# Patient Record
Sex: MALE | URBAN - METROPOLITAN AREA
[De-identification: names, ages, dates, MRNs, and addresses within clinical notes are randomized per-mention and may not be internally consistent; named-entity substitution may affect disease eponyms.]

---

## 2018-03-30 ENCOUNTER — APPOINTMENT (OUTPATIENT)
Age: 33
Setting detail: DERMATOLOGY
End: 2018-03-30

## 2018-03-30 VITALS — WEIGHT: 190 LBS | HEIGHT: 72 IN

## 2018-03-30 DIAGNOSIS — A63.0 ANOGENITAL (VENEREAL) WARTS: ICD-10-CM

## 2018-03-30 DIAGNOSIS — D485 NEOPLASM OF UNCERTAIN BEHAVIOR OF SKIN: ICD-10-CM

## 2018-03-30 DIAGNOSIS — D22 MELANOCYTIC NEVI: ICD-10-CM

## 2018-03-30 PROBLEM — D22.5 MELANOCYTIC NEVI OF TRUNK: Status: ACTIVE | Noted: 2018-03-30

## 2018-03-30 PROBLEM — D48.5 NEOPLASM OF UNCERTAIN BEHAVIOR OF SKIN: Status: ACTIVE | Noted: 2018-03-30

## 2018-03-30 PROCEDURE — OTHER TREATMENT REGIMEN: OTHER

## 2018-03-30 PROCEDURE — 99203 OFFICE O/P NEW LOW 30 MIN: CPT | Mod: 25

## 2018-03-30 PROCEDURE — OTHER FOLLOW UP FOR NEXT VISIT: OTHER

## 2018-03-30 PROCEDURE — OTHER REASSURANCE: OTHER

## 2018-03-30 PROCEDURE — OTHER LIQUID NITROGEN GENITALS MULTI: OTHER

## 2018-03-30 PROCEDURE — 54056 CRYOSURGERY PENIS LESION(S): CPT

## 2018-03-30 PROCEDURE — OTHER COUNSELING: OTHER

## 2018-03-30 ASSESSMENT — LOCATION DETAILED DESCRIPTION DERM
LOCATION DETAILED: RIGHT SUPERIOR UPPER BACK
LOCATION DETAILED: RIGHT ANTERIOR PROXIMAL UPPER ARM
LOCATION DETAILED: RIGHT INFERIOR UPPER BACK
LOCATION DETAILED: SUPRAPUBIC SKIN
LOCATION DETAILED: LEFT DORSAL SHAFT OF PENIS

## 2018-03-30 ASSESSMENT — LOCATION ZONE DERM
LOCATION ZONE: PENIS
LOCATION ZONE: TRUNK
LOCATION ZONE: ARM

## 2018-03-30 ASSESSMENT — LOCATION SIMPLE DESCRIPTION DERM
LOCATION SIMPLE: GROIN
LOCATION SIMPLE: PENIS
LOCATION SIMPLE: RIGHT UPPER ARM
LOCATION SIMPLE: RIGHT UPPER BACK

## 2018-03-30 ASSESSMENT — PAIN INTENSITY VAS: HOW INTENSE IS YOUR PAIN 0 BEING NO PAIN, 10 BEING THE MOST SEVERE PAIN POSSIBLE?: NO PAIN

## 2018-03-30 ASSESSMENT — TOTAL NUMBER OF CONDYLOMA: # OF LESIONS?: 10

## 2018-03-30 NOTE — PROCEDURE: TREATMENT REGIMEN
Plan: This mall is slightly darker than the surrounding moles however on dermatoscopic exam, there are no suspicious features. I did make the patient aware of the mole and I took a picture of it. Follow up if there are any changes otherwise follow up annually
Plan: Patient has a history of genital warts in the past, these have been there for several months. He did try topical imiquimod about one month ago which he said did cause irritation but did not lead to resolution. We did go ahead and treat several of the more prominent lesions today but there were a few on the distal penis that we did not treat with cryotherapy. Patient did take a sample of Veregen with him to start using on the remaining lesions. He will call me for a prescription of this is helping. We will recheck him in one month when he comes in for the removal of the lesion on the right upper inner arm. He will call with any questions or problems. I did emphasize to him to remain abstinent until these have fully resolved
Detail Level: Detailed
Plan: It is difficult to say what this might represent from clinical exam. This could be a simple irritated wart however it could represent some form of skin cancer. Patient is going to follow up in 2 to 4 weeks for shave removal
Detail Level: Simple

## 2018-03-30 NOTE — PROCEDURE: FOLLOW UP FOR NEXT VISIT
Detail Level: Detailed
Scheduled For Follow Up In (Optional): 2-4 weeks
Instructions (Optional): Shave removal for pathology
Scheduled For Follow Up In (Optional): 1 month

## 2018-03-30 NOTE — PROCEDURE: REASSURANCE
Additional Note: Otherwise normal full body skin exam, no suspicious lesions or abnormal moles. Follow up annually
Detail Level: Generalized
Include Location In Plan?: Yes

## 2018-03-30 NOTE — PROCEDURE: LIQUID NITROGEN GENITALS MULTI
Detail Level: Simple
Render Post-Care Instructions In Note?: no
Consent: The patient's consent was obtained including but not limited to risks of crusting, scabbing, blistering, scarring, darker or lighter pigmentary change, recurrence, incomplete removal and infection.
Post-Care Instructions: I reviewed with the patient in detail post-care instructions. Patient is to wear sunprotection, and avoid picking at any of the treated lesions. Pt may apply Vaseline to crusted or scabbing areas.
Total Number Of Lesions Treated: 7

## 2018-04-13 ENCOUNTER — APPOINTMENT (OUTPATIENT)
Age: 33
Setting detail: DERMATOLOGY
End: 2018-04-14

## 2018-04-13 DIAGNOSIS — D485 NEOPLASM OF UNCERTAIN BEHAVIOR OF SKIN: ICD-10-CM

## 2018-04-13 PROBLEM — D48.5 NEOPLASM OF UNCERTAIN BEHAVIOR OF SKIN: Status: ACTIVE | Noted: 2018-04-13

## 2018-04-13 PROCEDURE — OTHER FOLLOW UP FOR NEXT VISIT: OTHER

## 2018-04-13 PROCEDURE — 11300 SHAVE SKIN LESION 0.5 CM/<: CPT

## 2018-04-13 PROCEDURE — OTHER SHAVE REMOVAL AND DESTRUCTION: OTHER

## 2018-04-13 ASSESSMENT — LOCATION ZONE DERM: LOCATION ZONE: ARM

## 2018-04-13 ASSESSMENT — LOCATION SIMPLE DESCRIPTION DERM: LOCATION SIMPLE: RIGHT UPPER ARM

## 2018-04-13 ASSESSMENT — LOCATION DETAILED DESCRIPTION DERM: LOCATION DETAILED: RIGHT ANTERIOR MEDIAL PROXIMAL UPPER ARM

## 2018-04-13 ASSESSMENT — PAIN INTENSITY VAS: HOW INTENSE IS YOUR PAIN 0 BEING NO PAIN, 10 BEING THE MOST SEVERE PAIN POSSIBLE?: NO PAIN

## 2018-04-13 NOTE — PROCEDURE: SHAVE REMOVAL AND DESTRUCTION
Cautery Type: electrodesiccation
Bill For Surgical Tray: no
Size Of Lesion In Cm (Required For Billing): 0.4
Anesthesia Type: 1% Xylocaine with epinephrine
Hemostasis: Electrocautery
Detail Level: Detailed
Dressing: dry sterile dressing
Billing Type: Third-Party Bill
Post-Care Instructions: I reviewed with the patient in detail post-care instructions. Patient is to keep the biopsy site dry overnight, and then apply bacitracin twice daily until healed. Patient may apply hydrogen peroxide soaks to remove any crusting.
Anesthesia Volume In Cc: 0.7
Render Post-Care Instructions In Note?: yes
Consent: Written consent was obtained and risks were reviewed including but not limited to scarring, infection, bleeding, scabbing, incomplete removal, nerve damage and allergy to anesthesia.
Number Of Curettages: 1
Path Notes (To The Dermatopathologist): Possible BCC vs benign glandular papule, please evaluate
Notification Instructions: Patient will be notified of biopsy results. However, patient instructed to call the office if not contacted within 1 weeks.
Wound Care: Polysporin ointment

## 2018-05-08 ENCOUNTER — RX ONLY (RX ONLY)
Age: 33
End: 2018-05-08

## 2018-05-08 RX ORDER — IMIQUIMOD 50 MG/G
THIN COAT CREAM TOPICAL TIW
Qty: 1 | Refills: 1 | Status: ERX | COMMUNITY
Start: 2018-05-08

## 2018-07-22 ENCOUNTER — RX ONLY (RX ONLY)
Age: 33
End: 2018-07-22

## 2018-07-22 RX ORDER — PODOFILOX 5 MG/ML
THIN COAT SOLUTION TOPICAL AS DIRECTED
Qty: 1 | Refills: 1 | Status: ERX | COMMUNITY
Start: 2018-07-22

## 2019-01-22 ENCOUNTER — APPOINTMENT (OUTPATIENT)
Age: 34
Setting detail: DERMATOLOGY
End: 2019-01-22

## 2019-01-22 VITALS — HEIGHT: 72 IN

## 2019-01-22 DIAGNOSIS — B07.8 OTHER VIRAL WARTS: ICD-10-CM

## 2019-01-22 DIAGNOSIS — A63.0 ANOGENITAL (VENEREAL) WARTS: ICD-10-CM

## 2019-01-22 DIAGNOSIS — D22 MELANOCYTIC NEVI: ICD-10-CM

## 2019-01-22 DIAGNOSIS — L57.8 OTHER SKIN CHANGES DUE TO CHRONIC EXPOSURE TO NONIONIZING RADIATION: ICD-10-CM

## 2019-01-22 PROBLEM — D22.4 MELANOCYTIC NEVI OF SCALP AND NECK: Status: ACTIVE | Noted: 2019-01-22

## 2019-01-22 PROBLEM — D22.39 MELANOCYTIC NEVI OF OTHER PARTS OF FACE: Status: ACTIVE | Noted: 2019-01-22

## 2019-01-22 PROBLEM — D22.72 MELANOCYTIC NEVI OF LEFT LOWER LIMB, INCLUDING HIP: Status: ACTIVE | Noted: 2019-01-22

## 2019-01-22 PROBLEM — D22.5 MELANOCYTIC NEVI OF TRUNK: Status: ACTIVE | Noted: 2019-01-22

## 2019-01-22 PROCEDURE — OTHER MIPS QUALITY: OTHER

## 2019-01-22 PROCEDURE — OTHER TREATMENT REGIMEN: OTHER

## 2019-01-22 PROCEDURE — OTHER FOLLOW UP FOR NEXT VISIT: OTHER

## 2019-01-22 PROCEDURE — 99214 OFFICE O/P EST MOD 30 MIN: CPT

## 2019-01-22 PROCEDURE — OTHER COUNSELING: OTHER

## 2019-01-22 PROCEDURE — OTHER PRESCRIPTION: OTHER

## 2019-01-22 PROCEDURE — OTHER REASSURANCE: OTHER

## 2019-01-22 PROCEDURE — OTHER SUNSCREEN RECOMMENDATIONS: OTHER

## 2019-01-22 RX ORDER — SINECATECHINS 150 MG/G
APPLY THIN COAT OINTMENT TOPICAL TID
Qty: 1 | Refills: 1 | Status: ERX | COMMUNITY
Start: 2019-01-22

## 2019-01-22 ASSESSMENT — LOCATION ZONE DERM
LOCATION ZONE: FACE
LOCATION ZONE: NOSE
LOCATION ZONE: LEG
LOCATION ZONE: FEET
LOCATION ZONE: NECK
LOCATION ZONE: ARM
LOCATION ZONE: PENIS
LOCATION ZONE: TRUNK

## 2019-01-22 ASSESSMENT — LOCATION SIMPLE DESCRIPTION DERM
LOCATION SIMPLE: RIGHT UPPER BACK
LOCATION SIMPLE: NOSE
LOCATION SIMPLE: LEFT SHOULDER
LOCATION SIMPLE: RIGHT CHEEK
LOCATION SIMPLE: UPPER BACK
LOCATION SIMPLE: RIGHT ZYGOMA
LOCATION SIMPLE: INFERIOR FOREHEAD
LOCATION SIMPLE: LEFT CHEEK
LOCATION SIMPLE: LEFT UPPER BACK
LOCATION SIMPLE: ABDOMEN
LOCATION SIMPLE: LEFT FOREHEAD
LOCATION SIMPLE: RIGHT ANTERIOR NECK
LOCATION SIMPLE: CHEST
LOCATION SIMPLE: LEFT KNEE
LOCATION SIMPLE: RIGHT FOREARM
LOCATION SIMPLE: TRAPEZIAL NECK
LOCATION SIMPLE: PENIS
LOCATION SIMPLE: LEFT FOOT
LOCATION SIMPLE: RIGHT SHOULDER
LOCATION SIMPLE: GROIN
LOCATION SIMPLE: NECK
LOCATION SIMPLE: LEFT LOWER BACK
LOCATION SIMPLE: LEFT FOREARM

## 2019-01-22 ASSESSMENT — LOCATION DETAILED DESCRIPTION DERM
LOCATION DETAILED: RIGHT CENTRAL MALAR CHEEK
LOCATION DETAILED: RIGHT CENTRAL ZYGOMA
LOCATION DETAILED: LEFT POSTERIOR SHOULDER
LOCATION DETAILED: STERNUM
LOCATION DETAILED: NASAL DORSUM
LOCATION DETAILED: RIGHT CENTRAL LATERAL NECK
LOCATION DETAILED: RIGHT LATERAL ABDOMEN
LOCATION DETAILED: PERIUMBILICAL SKIN
LOCATION DETAILED: LEFT SUPERIOR UPPER BACK
LOCATION DETAILED: LEFT DORSAL SHAFT OF PENIS
LOCATION DETAILED: MID TRAPEZIAL NECK
LOCATION DETAILED: RIGHT MEDIAL SUPERIOR CHEST
LOCATION DETAILED: SUPRAPUBIC SKIN
LOCATION DETAILED: LEFT SUPERIOR PREAURICULAR CHEEK
LOCATION DETAILED: LEFT DORSAL FOOT
LOCATION DETAILED: INFERIOR MID FOREHEAD
LOCATION DETAILED: LEFT CENTRAL MALAR CHEEK
LOCATION DETAILED: RIGHT SUPERIOR ANTERIOR NECK
LOCATION DETAILED: INFERIOR THORACIC SPINE
LOCATION DETAILED: RIGHT POSTERIOR SHOULDER
LOCATION DETAILED: EPIGASTRIC SKIN
LOCATION DETAILED: LEFT SUPERIOR CENTRAL MALAR CHEEK
LOCATION DETAILED: LEFT LATERAL ABDOMEN
LOCATION DETAILED: RIGHT LATERAL UPPER BACK
LOCATION DETAILED: LEFT KNEE
LOCATION DETAILED: RIGHT PROXIMAL DORSAL FOREARM
LOCATION DETAILED: LEFT INFERIOR LATERAL MIDBACK
LOCATION DETAILED: LEFT INFERIOR FOREHEAD
LOCATION DETAILED: LEFT PROXIMAL DORSAL FOREARM

## 2019-01-22 ASSESSMENT — TOTAL NUMBER OF VERRUCA VULGARIS: # OF LESIONS?: 1

## 2019-01-22 ASSESSMENT — TOTAL NUMBER OF CONDYLOMA: # OF LESIONS?: 20

## 2019-01-22 NOTE — PROCEDURE: REASSURANCE
Detail Level: Generalized
Include Location In Plan?: Yes
Hide Additional Notes?: No
Detail Level: Detailed
Additional Note: Normal FBSE, no skin cancers or precancers, follow up annually
Additional Note: Patient reassured this appears benign, no treatment needed. Continue to monitor, follow up if there are any changes. Otherwise follow up annually
Additional Note: Normal FBSE, no abnormal moles or suspicious lesions, follow up annually

## 2019-01-22 NOTE — PROCEDURE: FOLLOW UP FOR NEXT VISIT
Detail Level: Detailed
Scheduled For Follow Up In (Optional): PRN
Scheduled For Follow Up In (Optional): 3-4 months
Instructions (Optional): If symptoms worsen or do not improve

## 2019-01-22 NOTE — HPI: EVALUATION OF SKIN LESION(S)
How Severe Are Your Spot(S)?: mild
Have Your Spot(S) Been Treated In The Past?: has not been treated
Hpi Title: Evaluation of Skin Lesions
Additional History: TBE, lesion on neck. Patient here for his annual full body skin exam. He does have a new mole that he has noticed on his right anterior neck for the last several months. Otherwise the mole is not changing. He has had moles removed in the past that were reported as slightly abnormal but no history of skin cancer. No other lesions that are changing or worrying him. He is doing much better about sunscreen use and sun protection. He’s no longer going to the tanning bed

## 2019-01-22 NOTE — PROCEDURE: TREATMENT REGIMEN
Plan: Veregen sent to patients local pharmacy. Copay savings card given in office today. If not cost effective, will send to [facility].  If these do not improve or reoccur, patient may need to come in for an actual biopsy to prove that this is what we are dealing with. We may also need to consider combining topical medication with cryotherapy
Initiate Treatment: Veregen ointment
Detail Level: Simple
Plan: No treatment today. Not bothersome for patient.

## 2019-01-28 RX ORDER — SINECATECHINS 150 MG/G
THIN COAT OINTMENT TOPICAL TID
Qty: 1 | Refills: 1 | Status: ERX

## 2019-05-28 ENCOUNTER — RX ONLY (RX ONLY)
Age: 34
End: 2019-05-28

## 2019-05-28 RX ORDER — PODOFILOX 5 MG/ML
THIN COAT SOLUTION TOPICAL AS DIRECTED
Qty: 1 | Refills: 1 | Status: ERX

## 2019-10-21 ENCOUNTER — APPOINTMENT (OUTPATIENT)
Age: 34
Setting detail: DERMATOLOGY
End: 2019-10-22

## 2019-10-21 VITALS — HEIGHT: 71 IN | WEIGHT: 190 LBS

## 2019-10-21 DIAGNOSIS — D22 MELANOCYTIC NEVI: ICD-10-CM

## 2019-10-21 DIAGNOSIS — B07.8 OTHER VIRAL WARTS: ICD-10-CM

## 2019-10-21 DIAGNOSIS — A63.0 ANOGENITAL (VENEREAL) WARTS: ICD-10-CM

## 2019-10-21 DIAGNOSIS — L57.8 OTHER SKIN CHANGES DUE TO CHRONIC EXPOSURE TO NONIONIZING RADIATION: ICD-10-CM

## 2019-10-21 PROBLEM — D22.5 MELANOCYTIC NEVI OF TRUNK: Status: ACTIVE | Noted: 2019-10-21

## 2019-10-21 PROCEDURE — OTHER SUNSCREEN RECOMMENDATIONS: OTHER

## 2019-10-21 PROCEDURE — OTHER TREATMENT REGIMEN: OTHER

## 2019-10-21 PROCEDURE — OTHER COUNSELING: OTHER

## 2019-10-21 PROCEDURE — OTHER REASSURANCE: OTHER

## 2019-10-21 PROCEDURE — OTHER LIQUID NITROGEN GENITALS MULTI: OTHER

## 2019-10-21 PROCEDURE — 54056 CRYOSURGERY PENIS LESION(S): CPT

## 2019-10-21 PROCEDURE — 17110 DESTRUCT B9 LESION 1-14: CPT

## 2019-10-21 PROCEDURE — OTHER LIQUID NITROGEN: OTHER

## 2019-10-21 PROCEDURE — OTHER MIPS QUALITY: OTHER

## 2019-10-21 PROCEDURE — OTHER FOLLOW UP FOR NEXT VISIT: OTHER

## 2019-10-21 PROCEDURE — 99214 OFFICE O/P EST MOD 30 MIN: CPT | Mod: 25

## 2019-10-21 ASSESSMENT — LOCATION SIMPLE DESCRIPTION DERM
LOCATION SIMPLE: POSTERIOR NECK
LOCATION SIMPLE: LEFT UPPER BACK
LOCATION SIMPLE: LEFT KNEE
LOCATION SIMPLE: PENIS
LOCATION SIMPLE: LEFT FOREHEAD
LOCATION SIMPLE: RIGHT LOWER BACK
LOCATION SIMPLE: GROIN
LOCATION SIMPLE: ABDOMEN
LOCATION SIMPLE: RIGHT UPPER BACK

## 2019-10-21 ASSESSMENT — LOCATION ZONE DERM
LOCATION ZONE: LEG
LOCATION ZONE: NECK
LOCATION ZONE: TRUNK
LOCATION ZONE: PENIS
LOCATION ZONE: FACE

## 2019-10-21 ASSESSMENT — LOCATION DETAILED DESCRIPTION DERM
LOCATION DETAILED: LEFT KNEE
LOCATION DETAILED: MID POSTERIOR NECK
LOCATION DETAILED: RIGHT SUPERIOR LATERAL UPPER BACK
LOCATION DETAILED: SUPRAPUBIC SKIN
LOCATION DETAILED: LEFT MEDIAL FOREHEAD
LOCATION DETAILED: EPIGASTRIC SKIN
LOCATION DETAILED: LEFT MID-UPPER BACK
LOCATION DETAILED: RIGHT SUPERIOR MEDIAL MIDBACK
LOCATION DETAILED: BASE OF THE PENIS

## 2019-10-21 ASSESSMENT — TOTAL NUMBER OF CONDYLOMA: # OF LESIONS?: 8

## 2019-10-21 NOTE — PROCEDURE: REASSURANCE
Detail Level: Generalized
Include Location In Plan?: Yes
Hide Additional Notes?: No
Additional Note: Normal FBSE, otherwise no abnormal moles or suspicious lesions. Follow up if lesions change or becomes painful
Additional Note: Normal FBSE, patient counseled regarding sunscreen and how to use properly. Follow up annually

## 2019-10-21 NOTE — HPI: EVALUATION OF SKIN LESION(S)
What Type Of Note Output Would You Prefer (Optional)?: Standard Output
How Severe Are Your Spot(S)?: mild
Have Your Spot(S) Been Treated In The Past?: has not been treated
Hpi Title: Evaluation of Skin Lesions
Additional History: TBE. Patient here for his annual full body skin check. He has no specific lesions that are changing. He does have a wart on his left anterior knee

## 2019-10-21 NOTE — PROCEDURE: LIQUID NITROGEN
Number Of Freeze-Thaw Cycles: 1 freeze-thaw cycle
Aperture Size (Optional): C
Medical Necessity Information: It is in your best interest to select a reason for this procedure from the list below. All of these items fulfill various CMS LCD requirements except the new and changing color options.
Duration Of Freeze Thaw-Cycle (Seconds): 12
Consent: The patient's consent was obtained including but not limited to risks of crusting, scabbing, blistering, scarring, darker or lighter pigmentary change, recurrence, incomplete removal and infection.
Detail Level: Detailed
Post-Care Instructions: I reviewed with the patient in detail post-care instructions. Patient is to wear sunprotection, and avoid picking at any of the treated lesions. Pt may apply Vaseline to crusted or scabbing areas.
Medical Necessity Clause: This procedure was medically necessary because the lesions that were treated were: enlarging and irritated
Render Note In Bullet Format When Appropriate: No
Render Post-Care Instructions In Note?: yes

## 2019-10-21 NOTE — PROCEDURE: LIQUID NITROGEN GENITALS MULTI
Total Number Of Lesions Treated: 8
Consent: The patient's consent was obtained including but not limited to risks of crusting, scabbing, blistering, scarring, darker or lighter pigmentary change, recurrence, incomplete removal and infection.
Detail Level: Simple
Post-Care Instructions: I reviewed with the patient in detail post-care instructions. Patient is to wear sunprotection, and avoid picking at any of the treated lesions. Pt may apply Vaseline to crusted or scabbing areas.
Render Post-Care Instructions In Note?: no

## 2019-10-21 NOTE — PROCEDURE: TREATMENT REGIMEN
Plan: Patient will schedule follow up appointment for shave removal with pathology.
Initiate Treatment: Liquid Nitrogen Multi
Detail Level: Simple
Plan: Hopefully this will lead to resolution. Patient can continue using the topical acid-based product as needed. Follow-up if retreatment or further treatment is needed
Initiate Treatment: Liquid Nitrogen single

## 2019-12-24 ENCOUNTER — RX ONLY (RX ONLY)
Age: 34
End: 2019-12-24

## 2019-12-24 RX ORDER — IMIQUIMOD 12.5 MG/.25G
THIN COAT CREAM TOPICAL TIW
Qty: 1 | Refills: 2 | Status: ERX

## 2020-02-26 NOTE — PROCEDURE: SUNSCREEN RECOMMENDATIONS

## 2020-05-21 ENCOUNTER — APPOINTMENT (OUTPATIENT)
Age: 35
Setting detail: DERMATOLOGY
End: 2020-05-29

## 2020-05-21 VITALS — TEMPERATURE: 98.6 F

## 2020-05-21 DIAGNOSIS — D22 MELANOCYTIC NEVI: ICD-10-CM

## 2020-05-21 DIAGNOSIS — A63.0 ANOGENITAL (VENEREAL) WARTS: ICD-10-CM

## 2020-05-21 PROBLEM — D22.5 MELANOCYTIC NEVI OF TRUNK: Status: ACTIVE | Noted: 2020-05-21

## 2020-05-21 PROCEDURE — OTHER TREATMENT REGIMEN: OTHER

## 2020-05-21 PROCEDURE — OTHER PRESCRIPTION: OTHER

## 2020-05-21 PROCEDURE — OTHER SHAVE REMOVAL: OTHER

## 2020-05-21 PROCEDURE — 11301 SHAVE SKIN LESION 0.6-1.0 CM: CPT

## 2020-05-21 PROCEDURE — OTHER LIQUID NITROGEN GENITALS MULTI: OTHER

## 2020-05-21 PROCEDURE — 54056 CRYOSURGERY PENIS LESION(S): CPT

## 2020-05-21 PROCEDURE — OTHER FOLLOW UP FOR NEXT VISIT: OTHER

## 2020-05-21 PROCEDURE — OTHER COUNSELING: OTHER

## 2020-05-21 RX ORDER — PODOFILOX 5 MG/ML
THIN COAT SOLUTION TOPICAL AS DIRECTED
Qty: 1 | Refills: 3 | Status: ERX | COMMUNITY
Start: 2020-05-21

## 2020-05-21 ASSESSMENT — LOCATION SIMPLE DESCRIPTION DERM
LOCATION SIMPLE: RIGHT UPPER BACK
LOCATION SIMPLE: PENIS
LOCATION SIMPLE: GROIN

## 2020-05-21 ASSESSMENT — LOCATION ZONE DERM
LOCATION ZONE: TRUNK
LOCATION ZONE: PENIS

## 2020-05-21 ASSESSMENT — LOCATION DETAILED DESCRIPTION DERM
LOCATION DETAILED: BASE OF THE PENIS
LOCATION DETAILED: RIGHT DORSAL SHAFT OF PENIS
LOCATION DETAILED: RIGHT INFERIOR MEDIAL UPPER BACK
LOCATION DETAILED: SUPRAPUBIC SKIN
LOCATION DETAILED: LEFT DORSAL SHAFT OF PENIS

## 2020-05-21 ASSESSMENT — TOTAL NUMBER OF CONDYLOMA: # OF LESIONS?: 16

## 2020-05-21 NOTE — PROCEDURE: TREATMENT REGIMEN
Continue Regimen: Topical
Initiate Treatment: Liquid Nitrogen Multi
Detail Level: Simple
Plan: Treated all visible lesions with cryotherapy today. He will also continue using the topical medication. I did encourage him to stop shaving the area as this may be leading to recurrences. He will follow up if  further treatment is needed. He has tried and failed topical Imiquimod and Veregen in the past

## 2020-05-21 NOTE — PROCEDURE: SHAVE REMOVAL
Hemostasis: Electrocautery
Add Variable For Additional Medical Justification: No
Anesthesia Volume In Cc: 0.8
Medical Necessity Information: It is in your best interest to select a reason for this procedure from the list below. All of these items fulfill various CMS LCD requirements except the new and changing color options.
Path Notes (To The Dermatopathologist): Atypical nevus, please evaluate
Detail Level: Detailed
Medical Necessity Clause: This procedure was medically necessary because the lesion that was treated was: changing color, suspicious for malignancy
Wound Care: Polysporin ointment
Size Of Margin In Cm (Margins Are Not Added To Billing Dimensions): 0.1
Post-Care Instructions: I reviewed with the patient in detail post-care instructions. Patient is to keep the biopsy site dry overnight, and then apply bacitracin twice daily until healed. Patient may apply hydrogen peroxide soaks to remove any crusting.
Anesthesia Type: 1% Xylocaine without epinephrine
Render Post-Care Instructions In Note?: yes
X Size Of Lesion In Cm (Optional): 0.5
Consent was obtained from the patient. The risks and benefits to therapy were discussed in detail. Specifically, the risks of infection, scarring, bleeding, prolonged wound healing, incomplete removal, allergy to anesthesia, nerve injury and recurrence were addressed. Prior to the procedure, the treatment site was clearly identified and confirmed by the patient. All components of Universal Protocol/PAUSE Rule completed.
Notification Instructions: Patient will be notified of biopsy results. However, patient instructed to call the office if not contacted within 2 weeks.
Biopsy Method: Dermablade
Size Of Lesion In Cm (Required): 0.7
Billing Type: Third-Party Bill

## 2020-05-21 NOTE — PROCEDURE: FOLLOW UP FOR NEXT VISIT
Detail Level: Detailed
Scheduled For Follow Up In (Optional): based on pathology
Detail Level: Simple
Scheduled For Follow Up In (Optional): as needed

## 2021-02-04 ENCOUNTER — APPOINTMENT (OUTPATIENT)
Age: 36
Setting detail: DERMATOLOGY
End: 2021-02-04

## 2021-02-04 VITALS — HEIGHT: 71 IN | TEMPERATURE: 97.9 F | WEIGHT: 185 LBS

## 2021-02-04 DIAGNOSIS — L57.8 OTHER SKIN CHANGES DUE TO CHRONIC EXPOSURE TO NONIONIZING RADIATION: ICD-10-CM

## 2021-02-04 DIAGNOSIS — Z87.2 PERSONAL HISTORY OF DISEASES OF THE SKIN AND SUBCUTANEOUS TISSUE: ICD-10-CM

## 2021-02-04 DIAGNOSIS — A63.0 ANOGENITAL (VENEREAL) WARTS: ICD-10-CM

## 2021-02-04 DIAGNOSIS — D22 MELANOCYTIC NEVI: ICD-10-CM

## 2021-02-04 PROBLEM — D22.5 MELANOCYTIC NEVI OF TRUNK: Status: ACTIVE | Noted: 2021-02-04

## 2021-02-04 PROBLEM — D22.39 MELANOCYTIC NEVI OF OTHER PARTS OF FACE: Status: ACTIVE | Noted: 2021-02-04

## 2021-02-04 PROCEDURE — OTHER PRESCRIPTION: OTHER

## 2021-02-04 PROCEDURE — OTHER SUNSCREEN RECOMMENDATIONS: OTHER

## 2021-02-04 PROCEDURE — OTHER COUNSELING: OTHER

## 2021-02-04 PROCEDURE — OTHER TREATMENT REGIMEN: OTHER

## 2021-02-04 PROCEDURE — OTHER MIPS QUALITY: OTHER

## 2021-02-04 PROCEDURE — OTHER REASSURANCE: OTHER

## 2021-02-04 PROCEDURE — OTHER FOLLOW UP FOR NEXT VISIT: OTHER

## 2021-02-04 PROCEDURE — 99213 OFFICE O/P EST LOW 20 MIN: CPT

## 2021-02-04 RX ORDER — IMIQUIMOD 50 MG/G
THIN COAT CREAM TOPICAL
Qty: 1 | Refills: 2 | Status: ERX | COMMUNITY
Start: 2021-02-04

## 2021-02-04 ASSESSMENT — TOTAL NUMBER OF CONDYLOMA: # OF LESIONS?: 20

## 2021-02-04 ASSESSMENT — LOCATION SIMPLE DESCRIPTION DERM
LOCATION SIMPLE: CHEST
LOCATION SIMPLE: POSTERIOR NECK
LOCATION SIMPLE: RIGHT UPPER BACK
LOCATION SIMPLE: LEFT FOREHEAD
LOCATION SIMPLE: GROIN
LOCATION SIMPLE: PENIS

## 2021-02-04 ASSESSMENT — LOCATION DETAILED DESCRIPTION DERM
LOCATION DETAILED: RIGHT INFERIOR MEDIAL UPPER BACK
LOCATION DETAILED: RIGHT MEDIAL TRAPEZIAL NECK
LOCATION DETAILED: LEFT DORSAL SHAFT OF PENIS
LOCATION DETAILED: LEFT INFERIOR MEDIAL FOREHEAD
LOCATION DETAILED: STERNUM
LOCATION DETAILED: SUPRAPUBIC SKIN
LOCATION DETAILED: RIGHT SUPERIOR MEDIAL UPPER BACK
LOCATION DETAILED: RIGHT DORSAL SHAFT OF PENIS

## 2021-02-04 ASSESSMENT — LOCATION ZONE DERM
LOCATION ZONE: PENIS
LOCATION ZONE: NECK
LOCATION ZONE: TRUNK
LOCATION ZONE: FACE

## 2021-02-04 NOTE — PROCEDURE: REASSURANCE
Hide Additional Notes?: No
Include Location In Plan?: Yes
Additional Note: Normal FBSE, patient counseled regarding sunscreen and how to use properly. Follow up annually.
Detail Level: Generalized
Additional Note: Normal FBSE, no abnormal moles or suspicious lesions. Follow up if lesions change in size, shape or color. Otherwise follow up annually.

## 2021-02-04 NOTE — PROCEDURE: FOLLOW UP FOR NEXT VISIT
Detail Level: Simple
Scheduled For Follow Up In (Optional): annually
Scheduled For Follow Up In (Optional): 3 months

## 2021-02-04 NOTE — PROCEDURE: TREATMENT REGIMEN
Initiate Treatment: Imiquimod
Plan: Numerous small lesions in the suprapubic and on the penis. Patient will re-start the Imiquimod , but will use for up to 16 weeks. It is possible he simply has not use the medication long enough. He will follow up if these are not resolving
Plan: WHS, NER. Follow up if any changes are noted, otherwise follow up annually for TBE.  Clinically clear, no evidence of any residual. Patient will continue to monitor and follow up annually
Detail Level: Detailed
Detail Level: Simple

## 2022-01-13 ENCOUNTER — APPOINTMENT (OUTPATIENT)
Age: 37
Setting detail: DERMATOLOGY
End: 2022-01-16

## 2022-01-13 DIAGNOSIS — L57.8 OTHER SKIN CHANGES DUE TO CHRONIC EXPOSURE TO NONIONIZING RADIATION: ICD-10-CM

## 2022-01-13 DIAGNOSIS — D22 MELANOCYTIC NEVI: ICD-10-CM

## 2022-01-13 DIAGNOSIS — A63.0 ANOGENITAL (VENEREAL) WARTS: ICD-10-CM

## 2022-01-13 DIAGNOSIS — Z87.2 PERSONAL HISTORY OF DISEASES OF THE SKIN AND SUBCUTANEOUS TISSUE: ICD-10-CM

## 2022-01-13 PROBLEM — D22.5 MELANOCYTIC NEVI OF TRUNK: Status: ACTIVE | Noted: 2022-01-13

## 2022-01-13 PROBLEM — D22.39 MELANOCYTIC NEVI OF OTHER PARTS OF FACE: Status: ACTIVE | Noted: 2022-01-13

## 2022-01-13 PROCEDURE — OTHER TREATMENT REGIMEN: OTHER

## 2022-01-13 PROCEDURE — OTHER COUNSELING: OTHER

## 2022-01-13 PROCEDURE — OTHER REASSURANCE: OTHER

## 2022-01-13 PROCEDURE — OTHER SUNSCREEN RECOMMENDATIONS: OTHER

## 2022-01-13 PROCEDURE — OTHER FOLLOW UP FOR NEXT VISIT: OTHER

## 2022-01-13 PROCEDURE — 99213 OFFICE O/P EST LOW 20 MIN: CPT

## 2022-01-13 ASSESSMENT — LOCATION SIMPLE DESCRIPTION DERM
LOCATION SIMPLE: PENIS
LOCATION SIMPLE: CHEST
LOCATION SIMPLE: POSTERIOR NECK
LOCATION SIMPLE: RIGHT UPPER BACK
LOCATION SIMPLE: LEFT FOREHEAD

## 2022-01-13 ASSESSMENT — LOCATION ZONE DERM
LOCATION ZONE: TRUNK
LOCATION ZONE: FACE
LOCATION ZONE: NECK
LOCATION ZONE: PENIS

## 2022-01-13 ASSESSMENT — LOCATION DETAILED DESCRIPTION DERM
LOCATION DETAILED: RIGHT DORSAL SHAFT OF PENIS
LOCATION DETAILED: LEFT INFERIOR MEDIAL FOREHEAD
LOCATION DETAILED: STERNUM
LOCATION DETAILED: LEFT DORSAL SHAFT OF PENIS
LOCATION DETAILED: RIGHT MEDIAL TRAPEZIAL NECK
LOCATION DETAILED: RIGHT INFERIOR MEDIAL UPPER BACK
LOCATION DETAILED: RIGHT SUPERIOR MEDIAL UPPER BACK

## 2022-01-13 NOTE — PROCEDURE: FOLLOW UP FOR NEXT VISIT
Scheduled For Follow Up In (Optional): annually
Detail Level: Simple
Scheduled For Follow Up In (Optional): 3 months

## 2022-01-13 NOTE — PROCEDURE: REASSURANCE
Detail Level: Generalized
Hide Include Location In Plan Question?: No
Include Location In Plan?: Yes
Additional Note: Normal FBSE, patient counseled regarding sunscreen and how to use properly. Follow up annually.
Additional Note: Normal FBSE, no abnormal moles or suspicious lesions. Follow up if lesions change in size, shape or color. Otherwise follow up annually.

## 2022-01-13 NOTE — PROCEDURE: TREATMENT REGIMEN
Detail Level: Simple
Plan: Patient with two small lesions on the dorsal shaft of the penis. He has used Condylox with fairly good success in the past but they seem to keep coming back. He’s going to try the topical medicine yet again and call or follow up with these don’t go away for either cryo-therapy or possible electrodesiccation and curettage
Plan: WHS, NER. Follow up if any changes are noted, otherwise follow up annually for TBE.  Clinically clear, no evidence of any residual. Patient will continue to monitor and follow up annually
Detail Level: Detailed

## 2022-01-13 NOTE — HPI: SKIN LESION
What Type Of Note Output Would You Prefer (Optional)?: Bullet Format
How Severe Is Your Skin Lesion?: moderate
Has Your Skin Lesion Been Treated?: not been treated
Is This A New Presentation, Or A Follow-Up?: Skin Lesions
Additional History: Patient presents for annual TBE,  patient has no areas of concern.

## 2022-08-23 ENCOUNTER — RX ONLY (RX ONLY)
Age: 37
End: 2022-08-23

## 2022-08-23 RX ORDER — PODOFILOX 5 MG/G
THIN COAT GEL TOPICAL QD
Qty: 45 | Refills: 0 | Status: ERX | COMMUNITY
Start: 2022-08-23

## 2023-09-06 ENCOUNTER — APPOINTMENT (OUTPATIENT)
Age: 38
Setting detail: DERMATOLOGY
End: 2023-09-07

## 2023-09-06 DIAGNOSIS — D22 MELANOCYTIC NEVI: ICD-10-CM

## 2023-09-06 DIAGNOSIS — Z87.2 PERSONAL HISTORY OF DISEASES OF THE SKIN AND SUBCUTANEOUS TISSUE: ICD-10-CM

## 2023-09-06 DIAGNOSIS — L81.8 OTHER SPECIFIED DISORDERS OF PIGMENTATION: ICD-10-CM

## 2023-09-06 DIAGNOSIS — B35.6 TINEA CRURIS: ICD-10-CM

## 2023-09-06 DIAGNOSIS — Z71.89 OTHER SPECIFIED COUNSELING: ICD-10-CM

## 2023-09-06 DIAGNOSIS — L57.8 OTHER SKIN CHANGES DUE TO CHRONIC EXPOSURE TO NONIONIZING RADIATION: ICD-10-CM

## 2023-09-06 DIAGNOSIS — A63.0 ANOGENITAL (VENEREAL) WARTS: ICD-10-CM

## 2023-09-06 PROBLEM — D22.5 MELANOCYTIC NEVI OF TRUNK: Status: ACTIVE | Noted: 2023-09-06

## 2023-09-06 PROBLEM — D22.4 MELANOCYTIC NEVI OF SCALP AND NECK: Status: ACTIVE | Noted: 2023-09-06

## 2023-09-06 PROCEDURE — OTHER SUNSCREEN RECOMMENDATIONS: OTHER

## 2023-09-06 PROCEDURE — 99213 OFFICE O/P EST LOW 20 MIN: CPT

## 2023-09-06 PROCEDURE — OTHER FOLLOW UP FOR NEXT VISIT: OTHER

## 2023-09-06 PROCEDURE — OTHER PRESCRIPTION: OTHER

## 2023-09-06 PROCEDURE — OTHER TREATMENT REGIMEN: OTHER

## 2023-09-06 PROCEDURE — OTHER COUNSELING: OTHER

## 2023-09-06 PROCEDURE — OTHER REASSURANCE: OTHER

## 2023-09-06 RX ORDER — IMIQUIMOD 12.5 MG/.25G
THIN COAT CREAM TOPICAL
Qty: 12 | Refills: 3 | Status: ERX | COMMUNITY
Start: 2023-09-06

## 2023-09-06 RX ORDER — KETOCONAZOLE 20 MG/G
THIN COAT CREAM TOPICAL BID
Qty: 30 | Refills: 1 | Status: ERX | COMMUNITY
Start: 2023-09-06

## 2023-09-06 ASSESSMENT — SEVERITY ASSESSMENT: SEVERITY: MILD

## 2023-09-06 ASSESSMENT — LOCATION SIMPLE DESCRIPTION DERM
LOCATION SIMPLE: SCALP
LOCATION SIMPLE: INFERIOR FOREHEAD
LOCATION SIMPLE: LOWER BACK
LOCATION SIMPLE: CHEST
LOCATION SIMPLE: RIGHT THIGH
LOCATION SIMPLE: RIGHT UPPER BACK
LOCATION SIMPLE: ABDOMEN
LOCATION SIMPLE: PENIS
LOCATION SIMPLE: TRAPEZIAL NECK
LOCATION SIMPLE: LEFT UPPER ARM

## 2023-09-06 ASSESSMENT — LOCATION DETAILED DESCRIPTION DERM
LOCATION DETAILED: LEFT ANTERIOR DISTAL UPPER ARM
LOCATION DETAILED: INFERIOR LUMBAR SPINE
LOCATION DETAILED: RIGHT DORSAL SHAFT OF PENIS
LOCATION DETAILED: RIGHT SUPERIOR LATERAL UPPER BACK
LOCATION DETAILED: LEFT DORSAL SHAFT OF PENIS
LOCATION DETAILED: RIGHT ANTERIOR PROXIMAL THIGH
LOCATION DETAILED: LEFT CENTRAL PARIETAL SCALP
LOCATION DETAILED: MID TRAPEZIAL NECK
LOCATION DETAILED: RIGHT INFERIOR MEDIAL UPPER BACK
LOCATION DETAILED: RIGHT LATERAL ABDOMEN
LOCATION DETAILED: LEFT PROXIMAL POSTERIOR UPPER ARM
LOCATION DETAILED: INFERIOR MID FOREHEAD
LOCATION DETAILED: RIGHT CENTRAL PARIETAL SCALP
LOCATION DETAILED: RIGHT LATERAL SUPERIOR CHEST
LOCATION DETAILED: PERIUMBILICAL SKIN

## 2023-09-06 ASSESSMENT — TOTAL NUMBER OF CONDYLOMA: # OF LESIONS?: 3

## 2023-09-06 ASSESSMENT — LOCATION ZONE DERM
LOCATION ZONE: NECK
LOCATION ZONE: ARM
LOCATION ZONE: PENIS
LOCATION ZONE: SCALP
LOCATION ZONE: TRUNK
LOCATION ZONE: FACE
LOCATION ZONE: LEG

## 2023-09-06 NOTE — PROCEDURE: REASSURANCE
Include Location In Plan?: No
Detail Level: Generalized
Additional Note: Normal FBSE, patient counseled regarding sunscreen and how to use properly.  Follow up if areas change or become painful, otherwise follow up annually.
Additional Note: Normal FBSE, patient reassured no abnormal moles or suspicious lesions.  Follow up if lesions change or become painful, otherwise follow up annually.

## 2023-09-06 NOTE — PROCEDURE: TREATMENT REGIMEN
Detail Level: Simple
Plan: Small macules of hyperpigmentation per. Likely tinea cruris. He will use the topical antifungal, but if no improvement in 2 to 4 weeks, he can call for a different treatment recommendations such as possible. Topical steroid versus tacrolimus. Patient will apply thin coat of 2% Ketoconazole cream BID for 2-4 weeks.  He will call if he does not get resolution.
Detail Level: Detailed
Initiate Treatment: Ketoconazole cream
Plan: Patient with 3-4 small lesions on the dorsal shaft of the penis. He has use Imiquimod topical.  If he does not get resolution, he will call the office.  He has had  fairly good success in the past but they seem to keep coming back. He’s going to try the topical medicine yet again and call or follow up with these don’t go away for either cryo-therapy or possible electrodesiccation and curettage
Plan: WHS, NER. Follow up if any changes are noted, otherwise follow up annually for TBE.  Clinically clear, no evidence of any residual. Patient will continue to monitor and follow up annually

## 2023-09-06 NOTE — HPI: FULL BODY SKIN EXAMINATION
How Severe Are Your Spot(S)?: moderate
What Type Of Note Output Would You Prefer (Optional)?: Bullet Format
What Is The Reason For Today's Visit?: Full Body Skin Examination
What Is The Reason For Today's Visit? (Being Monitored For X): the development of new lesions
Additional History: Patient presents for full body skin exam.  He does have a mole on his back he would like evaluated to see if it is abnormal.

## 2023-09-06 NOTE — PROCEDURE: FOLLOW UP FOR NEXT VISIT
Scheduled For Follow Up In (Optional): prn
Detail Level: Simple
Scheduled For Follow Up In (Optional): annually

## 2025-01-02 ENCOUNTER — APPOINTMENT (OUTPATIENT)
Age: 40
Setting detail: DERMATOLOGY
End: 2025-01-02

## 2025-01-02 DIAGNOSIS — A63.0 ANOGENITAL (VENEREAL) WARTS: ICD-10-CM

## 2025-01-02 DIAGNOSIS — D22 MELANOCYTIC NEVI: ICD-10-CM

## 2025-01-02 DIAGNOSIS — Z87.2 PERSONAL HISTORY OF DISEASES OF THE SKIN AND SUBCUTANEOUS TISSUE: ICD-10-CM

## 2025-01-02 DIAGNOSIS — L21.8 OTHER SEBORRHEIC DERMATITIS: ICD-10-CM

## 2025-01-02 DIAGNOSIS — L81.8 OTHER SPECIFIED DISORDERS OF PIGMENTATION: ICD-10-CM

## 2025-01-02 PROBLEM — D22.9 MELANOCYTIC NEVI, UNSPECIFIED: Status: ACTIVE | Noted: 2025-01-02

## 2025-01-02 PROBLEM — D22.5 MELANOCYTIC NEVI OF TRUNK: Status: ACTIVE | Noted: 2025-01-02

## 2025-01-02 PROCEDURE — OTHER MIPS QUALITY: OTHER

## 2025-01-02 PROCEDURE — OTHER FOLLOW UP FOR NEXT VISIT: OTHER

## 2025-01-02 PROCEDURE — 99214 OFFICE O/P EST MOD 30 MIN: CPT

## 2025-01-02 PROCEDURE — OTHER TREATMENT REGIMEN: OTHER

## 2025-01-02 PROCEDURE — OTHER REASSURANCE: OTHER

## 2025-01-02 PROCEDURE — OTHER PRESCRIPTION: OTHER

## 2025-01-02 PROCEDURE — OTHER COUNSELING: OTHER

## 2025-01-02 RX ORDER — PODOFILOX 5 MG/G
THIN COAT GEL TOPICAL AS DIRECTED
Qty: 3.5 | Refills: 6 | Status: ERX | COMMUNITY
Start: 2025-01-02

## 2025-01-02 RX ORDER — CLOBETASOL PROPIONATE 0.46 MG/ML
APPLY SOLUTION TOPICAL DAILY
Qty: 25 | Refills: 2 | Status: ERX | COMMUNITY
Start: 2025-01-02

## 2025-01-02 ASSESSMENT — LOCATION SIMPLE DESCRIPTION DERM
LOCATION SIMPLE: PENIS
LOCATION SIMPLE: POSTERIOR SCALP
LOCATION SIMPLE: RIGHT UPPER BACK
LOCATION SIMPLE: CHEST
LOCATION SIMPLE: LEFT UPPER ARM
LOCATION SIMPLE: ABDOMEN
LOCATION SIMPLE: LEFT PLANTAR SURFACE
LOCATION SIMPLE: SCALP

## 2025-01-02 ASSESSMENT — LOCATION ZONE DERM
LOCATION ZONE: TRUNK
LOCATION ZONE: ARM
LOCATION ZONE: SCALP
LOCATION ZONE: FEET
LOCATION ZONE: PENIS

## 2025-01-02 ASSESSMENT — SEVERITY ASSESSMENT: HOW SEVERE IS THIS PATIENT'S CONDITION?: ALMOST CLEAR

## 2025-01-02 ASSESSMENT — LOCATION DETAILED DESCRIPTION DERM
LOCATION DETAILED: LEFT DORSAL SHAFT OF PENIS
LOCATION DETAILED: RIGHT LATERAL ABDOMEN
LOCATION DETAILED: RIGHT SUPERIOR OCCIPITAL SCALP
LOCATION DETAILED: RIGHT SUPERIOR MEDIAL UPPER BACK
LOCATION DETAILED: LEFT ANTERIOR DISTAL UPPER ARM
LOCATION DETAILED: LEFT ARCH
LOCATION DETAILED: LEFT SUPERIOR PARIETAL SCALP
LOCATION DETAILED: RIGHT LATERAL SUPERIOR CHEST
LOCATION DETAILED: RIGHT INFERIOR MEDIAL UPPER BACK
LOCATION DETAILED: LEFT PROXIMAL POSTERIOR UPPER ARM

## 2025-01-02 ASSESSMENT — TOTAL NUMBER OF CONDYLOMA: # OF LESIONS?: 4

## 2025-01-02 NOTE — PROCEDURE: FOLLOW UP FOR NEXT VISIT
Scheduled For Follow Up In (Optional): annually
Detail Level: Simple
Scheduled For Follow Up In (Optional): 6 months
Scheduled For Follow Up In (Optional): 2-3 months if not resolved

## 2025-01-02 NOTE — PROCEDURE: TREATMENT REGIMEN
Detail Level: Detailed
Plan: WHS, NER. Follow up if any changes are noted, otherwise follow up annually for TBE.  Clinically clear, no evidence of any residual. Patient will continue to monitor and follow up annually
Plan: Clinically there appears to be four lesions on the dorsal shaft of the penis. 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 In light of the fact that the topical medication has not completely resolved him of this issue I did discuss cryotherapy versus electrodesiccation and curettage. He does not want to proceed with either of those physical treatments as he’s worried about Healing and discoloration. He is willing to have one of those done at the follow up visit if the current prescription of pedophilic does not lead to resolution.  His wife has no history of HPV and she does get her regular Pap smear’s.
Detail Level: Simple
Plan: Patient has some mild itching, mild redness, and mild scaling. Hopefully the topical steroid short course will lead to resolution. He can use the topical steroid periodically, but I did cancel him about not using it every day. If this does not resolve or continues we did discuss possible biopsy for further determination of the cause of the irritation and maybe the hair loss Which is very mild at this point.

## 2025-01-02 NOTE — PROCEDURE: REASSURANCE
Include Location In Plan?: Yes
Detail Level: Detailed
Hide Include Location In Plan Question?: No
Detail Level: Generalized
Additional Note: Otherwise normal full body skin exam. No suspicious lesions or abnormal moles. Patient will continue to follow up annually.

## 2025-01-02 NOTE — HPI: FULL BODY SKIN EXAMINATION
What Is The Reason For Today's Visit?: Full Body Skin Examination
Additional History: Patient here for his annual full body skin exam. He did have a spot on his right temple that showed up a few weeks ago was slightly itchy, but it since gone away. He just wants to make sure there’s no residual or anything that might be slightly dangerous. 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 He wants to know if there are other topical options as he’s tried. Everything else prescription based in the past with mixed results.